# Patient Record
Sex: FEMALE | Race: BLACK OR AFRICAN AMERICAN | Employment: FULL TIME | URBAN - METROPOLITAN AREA
[De-identification: names, ages, dates, MRNs, and addresses within clinical notes are randomized per-mention and may not be internally consistent; named-entity substitution may affect disease eponyms.]

---

## 2020-07-17 ENCOUNTER — APPOINTMENT (OUTPATIENT)
Dept: GENERAL RADIOLOGY | Age: 55
End: 2020-07-17

## 2020-07-17 ENCOUNTER — APPOINTMENT (OUTPATIENT)
Dept: CT IMAGING | Age: 55
End: 2020-07-17

## 2020-07-17 ENCOUNTER — HOSPITAL ENCOUNTER (OUTPATIENT)
Age: 55
Setting detail: OBSERVATION
Discharge: HOME | End: 2020-07-19
Attending: EMERGENCY MEDICINE | Admitting: INTERNAL MEDICINE
Payer: MEDICAID

## 2020-07-17 LAB
ALBUMIN SERPL-MCNC: 3.9 G/DL (ref 3.4–5)
ALP BLD-CCNC: 106 U/L (ref 40–129)
ALT SERPL-CCNC: 15 U/L (ref 10–40)
ANION GAP SERPL CALCULATED.3IONS-SCNC: 13 MMOL/L (ref 3–16)
AST SERPL-CCNC: 12 U/L (ref 15–37)
BASOPHILS ABSOLUTE: 0 K/UL (ref 0–0.2)
BASOPHILS RELATIVE PERCENT: 0.4 %
BILIRUB SERPL-MCNC: 0.6 MG/DL (ref 0–1)
BILIRUBIN DIRECT: <0.2 MG/DL (ref 0–0.3)
BILIRUBIN URINE: NEGATIVE
BILIRUBIN, INDIRECT: ABNORMAL MG/DL (ref 0–1)
BLOOD, URINE: NEGATIVE
BUN BLDV-MCNC: 10 MG/DL (ref 7–20)
CALCIUM SERPL-MCNC: 9.3 MG/DL (ref 8.3–10.6)
CHLORIDE BLD-SCNC: 104 MMOL/L (ref 99–110)
CLARITY: ABNORMAL
CO2: 22 MMOL/L (ref 21–32)
COLOR: YELLOW
CREAT SERPL-MCNC: 0.6 MG/DL (ref 0.6–1.1)
EOSINOPHILS ABSOLUTE: 0.1 K/UL (ref 0–0.6)
EOSINOPHILS RELATIVE PERCENT: 1.6 %
GFR AFRICAN AMERICAN: >60
GFR NON-AFRICAN AMERICAN: >60
GLUCOSE BLD-MCNC: 312 MG/DL (ref 70–99)
GLUCOSE BLD-MCNC: 333 MG/DL (ref 70–99)
GLUCOSE URINE: >=1000 MG/DL
HCG(URINE) PREGNANCY TEST: NEGATIVE
HCT VFR BLD CALC: 36.1 % (ref 36–48)
HEMOGLOBIN: 11.5 G/DL (ref 12–16)
INR BLD: 1.14 (ref 0.86–1.14)
KETONES, URINE: 40 MG/DL
LEUKOCYTE ESTERASE, URINE: ABNORMAL
LYMPHOCYTES ABSOLUTE: 2 K/UL (ref 1–5.1)
LYMPHOCYTES RELATIVE PERCENT: 37.6 %
MCH RBC QN AUTO: 22.4 PG (ref 26–34)
MCHC RBC AUTO-ENTMCNC: 31.9 G/DL (ref 31–36)
MCV RBC AUTO: 70.3 FL (ref 80–100)
MICROSCOPIC EXAMINATION: YES
MONOCYTES ABSOLUTE: 0.4 K/UL (ref 0–1.3)
MONOCYTES RELATIVE PERCENT: 7.1 %
NEUTROPHILS ABSOLUTE: 2.8 K/UL (ref 1.7–7.7)
NEUTROPHILS RELATIVE PERCENT: 53.3 %
NITRITE, URINE: NEGATIVE
PDW BLD-RTO: 13.8 % (ref 12.4–15.4)
PERFORMED ON: ABNORMAL
PH UA: 6.5 (ref 5–8)
PLATELET # BLD: 122 K/UL (ref 135–450)
PMV BLD AUTO: 10.9 FL (ref 5–10.5)
POTASSIUM REFLEX MAGNESIUM: 3.7 MMOL/L (ref 3.5–5.1)
PRO-BNP: 32 PG/ML (ref 0–124)
PROTEIN UA: ABNORMAL MG/DL
PROTHROMBIN TIME: 13.2 SEC (ref 10–13.2)
RBC # BLD: 5.14 M/UL (ref 4–5.2)
SODIUM BLD-SCNC: 139 MMOL/L (ref 136–145)
SPECIFIC GRAVITY UA: >1.03 (ref 1–1.03)
TOTAL CK: 81 U/L (ref 26–192)
TOTAL PROTEIN: 6.6 G/DL (ref 6.4–8.2)
URINE TYPE: ABNORMAL
UROBILINOGEN, URINE: 1 E.U./DL
WBC # BLD: 5.2 K/UL (ref 4–11)

## 2020-07-17 PROCEDURE — 80076 HEPATIC FUNCTION PANEL: CPT

## 2020-07-17 PROCEDURE — 85025 COMPLETE CBC W/AUTO DIFF WBC: CPT

## 2020-07-17 PROCEDURE — 83880 ASSAY OF NATRIURETIC PEPTIDE: CPT

## 2020-07-17 PROCEDURE — 86592 SYPHILIS TEST NON-TREP QUAL: CPT

## 2020-07-17 PROCEDURE — 93005 ELECTROCARDIOGRAM TRACING: CPT | Performed by: EMERGENCY MEDICINE

## 2020-07-17 PROCEDURE — 80307 DRUG TEST PRSMV CHEM ANLYZR: CPT

## 2020-07-17 PROCEDURE — 85610 PROTHROMBIN TIME: CPT

## 2020-07-17 PROCEDURE — 71045 X-RAY EXAM CHEST 1 VIEW: CPT

## 2020-07-17 PROCEDURE — 86762 RUBELLA ANTIBODY: CPT

## 2020-07-17 PROCEDURE — 99285 EMERGENCY DEPT VISIT HI MDM: CPT

## 2020-07-17 PROCEDURE — 82550 ASSAY OF CK (CPK): CPT

## 2020-07-17 PROCEDURE — 84703 CHORIONIC GONADOTROPIN ASSAY: CPT

## 2020-07-17 PROCEDURE — 87340 HEPATITIS B SURFACE AG IA: CPT

## 2020-07-17 PROCEDURE — 70450 CT HEAD/BRAIN W/O DYE: CPT

## 2020-07-17 PROCEDURE — 81001 URINALYSIS AUTO W/SCOPE: CPT

## 2020-07-17 PROCEDURE — 80048 BASIC METABOLIC PNL TOTAL CA: CPT

## 2020-07-17 SDOH — HEALTH STABILITY: MENTAL HEALTH: HOW OFTEN DO YOU HAVE A DRINK CONTAINING ALCOHOL?: NEVER

## 2020-07-18 ENCOUNTER — APPOINTMENT (OUTPATIENT)
Dept: CT IMAGING | Age: 55
End: 2020-07-18

## 2020-07-18 ENCOUNTER — APPOINTMENT (OUTPATIENT)
Dept: MRI IMAGING | Age: 55
End: 2020-07-18

## 2020-07-18 PROBLEM — R29.90 STROKE-LIKE SYMPTOMS: Status: ACTIVE | Noted: 2020-07-18

## 2020-07-18 LAB
ABO/RH: NORMAL
AMPHETAMINE SCREEN, URINE: NORMAL
ANION GAP SERPL CALCULATED.3IONS-SCNC: 15 MMOL/L (ref 3–16)
ANTIBODY SCREEN: NORMAL
BACTERIA: ABNORMAL /HPF
BARBITURATE SCREEN URINE: NORMAL
BASOPHILS ABSOLUTE: 0 K/UL (ref 0–0.2)
BASOPHILS RELATIVE PERCENT: 0.3 %
BENZODIAZEPINE SCREEN, URINE: NORMAL
BUN BLDV-MCNC: 11 MG/DL (ref 7–20)
CALCIUM SERPL-MCNC: 9.7 MG/DL (ref 8.3–10.6)
CANNABINOID SCREEN URINE: NORMAL
CHLORIDE BLD-SCNC: 98 MMOL/L (ref 99–110)
CO2: 20 MMOL/L (ref 21–32)
COCAINE METABOLITE SCREEN URINE: NORMAL
CREAT SERPL-MCNC: 0.7 MG/DL (ref 0.6–1.1)
EKG ATRIAL RATE: 77 BPM
EKG DIAGNOSIS: NORMAL
EKG P AXIS: 67 DEGREES
EKG P-R INTERVAL: 176 MS
EKG Q-T INTERVAL: 394 MS
EKG QRS DURATION: 82 MS
EKG QTC CALCULATION (BAZETT): 445 MS
EKG R AXIS: 3 DEGREES
EKG T AXIS: 47 DEGREES
EKG VENTRICULAR RATE: 77 BPM
EOSINOPHILS ABSOLUTE: 0.1 K/UL (ref 0–0.6)
EOSINOPHILS RELATIVE PERCENT: 2.7 %
EPITHELIAL CELLS, UA: 4 /HPF (ref 0–5)
GFR AFRICAN AMERICAN: >60
GFR NON-AFRICAN AMERICAN: >60
GLUCOSE BLD-MCNC: 244 MG/DL (ref 70–99)
HCT VFR BLD CALC: 38.9 % (ref 36–48)
HEMOGLOBIN: 12.3 G/DL (ref 12–16)
HEPATITIS B SURFACE ANTIGEN INTERPRETATION: ABNORMAL
HYALINE CASTS: 1 /LPF (ref 0–8)
LYMPHOCYTES ABSOLUTE: 2.3 K/UL (ref 1–5.1)
LYMPHOCYTES RELATIVE PERCENT: 42.1 %
Lab: NORMAL
MCH RBC QN AUTO: 22.3 PG (ref 26–34)
MCHC RBC AUTO-ENTMCNC: 31.7 G/DL (ref 31–36)
MCV RBC AUTO: 70.4 FL (ref 80–100)
METHADONE SCREEN, URINE: NORMAL
MONOCYTES ABSOLUTE: 0.5 K/UL (ref 0–1.3)
MONOCYTES RELATIVE PERCENT: 8.3 %
NEUTROPHILS ABSOLUTE: 2.6 K/UL (ref 1.7–7.7)
NEUTROPHILS RELATIVE PERCENT: 46.6 %
OPIATE SCREEN URINE: NORMAL
OXYCODONE URINE: NORMAL
PDW BLD-RTO: 13.8 % (ref 12.4–15.4)
PH UA: 6
PHENCYCLIDINE SCREEN URINE: NORMAL
PLATELET # BLD: 129 K/UL (ref 135–450)
PLATELET SLIDE REVIEW: ABNORMAL
PMV BLD AUTO: 10.9 FL (ref 5–10.5)
POTASSIUM REFLEX MAGNESIUM: 3.7 MMOL/L (ref 3.5–5.1)
PROPOXYPHENE SCREEN: NORMAL
RBC # BLD: 5.53 M/UL (ref 4–5.2)
RBC UA: 1 /HPF (ref 0–4)
RUBELLA ANTIBODY IGG: 75.1 IU/ML
SODIUM BLD-SCNC: 133 MMOL/L (ref 136–145)
TOTAL SYPHILLIS IGG/IGM: ABNORMAL
WBC # BLD: 5.5 K/UL (ref 4–11)
WBC UA: 57 /HPF (ref 0–5)

## 2020-07-18 PROCEDURE — 70496 CT ANGIOGRAPHY HEAD: CPT

## 2020-07-18 PROCEDURE — 94760 N-INVAS EAR/PLS OXIMETRY 1: CPT

## 2020-07-18 PROCEDURE — G0378 HOSPITAL OBSERVATION PER HR: HCPCS

## 2020-07-18 PROCEDURE — 97530 THERAPEUTIC ACTIVITIES: CPT

## 2020-07-18 PROCEDURE — 86901 BLOOD TYPING SEROLOGIC RH(D): CPT

## 2020-07-18 PROCEDURE — 96365 THER/PROPH/DIAG IV INF INIT: CPT

## 2020-07-18 PROCEDURE — 97162 PT EVAL MOD COMPLEX 30 MIN: CPT

## 2020-07-18 PROCEDURE — 6370000000 HC RX 637 (ALT 250 FOR IP): Performed by: NURSE PRACTITIONER

## 2020-07-18 PROCEDURE — 6360000002 HC RX W HCPCS: Performed by: NURSE PRACTITIONER

## 2020-07-18 PROCEDURE — 36415 COLL VENOUS BLD VENIPUNCTURE: CPT

## 2020-07-18 PROCEDURE — 86900 BLOOD TYPING SEROLOGIC ABO: CPT

## 2020-07-18 PROCEDURE — 93010 ELECTROCARDIOGRAM REPORT: CPT | Performed by: INTERNAL MEDICINE

## 2020-07-18 PROCEDURE — 97116 GAIT TRAINING THERAPY: CPT

## 2020-07-18 PROCEDURE — 86850 RBC ANTIBODY SCREEN: CPT

## 2020-07-18 PROCEDURE — 6360000004 HC RX CONTRAST MEDICATION: Performed by: INTERNAL MEDICINE

## 2020-07-18 PROCEDURE — 2580000003 HC RX 258: Performed by: NURSE PRACTITIONER

## 2020-07-18 PROCEDURE — 70450 CT HEAD/BRAIN W/O DYE: CPT

## 2020-07-18 PROCEDURE — 70551 MRI BRAIN STEM W/O DYE: CPT

## 2020-07-18 PROCEDURE — 80048 BASIC METABOLIC PNL TOTAL CA: CPT

## 2020-07-18 PROCEDURE — 97166 OT EVAL MOD COMPLEX 45 MIN: CPT

## 2020-07-18 PROCEDURE — 86780 TREPONEMA PALLIDUM: CPT

## 2020-07-18 RX ORDER — POLYETHYLENE GLYCOL 3350 17 G/17G
17 POWDER, FOR SOLUTION ORAL DAILY PRN
Status: DISCONTINUED | OUTPATIENT
Start: 2020-07-18 | End: 2020-07-19 | Stop reason: HOSPADM

## 2020-07-18 RX ORDER — ASPIRIN 300 MG/1
300 SUPPOSITORY RECTAL DAILY
Status: DISCONTINUED | OUTPATIENT
Start: 2020-07-18 | End: 2020-07-19 | Stop reason: HOSPADM

## 2020-07-18 RX ORDER — LABETALOL HYDROCHLORIDE 5 MG/ML
10 INJECTION, SOLUTION INTRAVENOUS EVERY 10 MIN PRN
Status: DISCONTINUED | OUTPATIENT
Start: 2020-07-18 | End: 2020-07-19 | Stop reason: HOSPADM

## 2020-07-18 RX ORDER — SODIUM CHLORIDE 0.9 % (FLUSH) 0.9 %
10 SYRINGE (ML) INJECTION PRN
Status: DISCONTINUED | OUTPATIENT
Start: 2020-07-18 | End: 2020-07-19 | Stop reason: HOSPADM

## 2020-07-18 RX ORDER — SODIUM CHLORIDE 0.9 % (FLUSH) 0.9 %
10 SYRINGE (ML) INJECTION EVERY 12 HOURS SCHEDULED
Status: DISCONTINUED | OUTPATIENT
Start: 2020-07-18 | End: 2020-07-19 | Stop reason: HOSPADM

## 2020-07-18 RX ORDER — ONDANSETRON 2 MG/ML
4 INJECTION INTRAMUSCULAR; INTRAVENOUS EVERY 6 HOURS PRN
Status: DISCONTINUED | OUTPATIENT
Start: 2020-07-18 | End: 2020-07-19 | Stop reason: HOSPADM

## 2020-07-18 RX ORDER — ASPIRIN 81 MG/1
81 TABLET ORAL DAILY
Status: DISCONTINUED | OUTPATIENT
Start: 2020-07-18 | End: 2020-07-19 | Stop reason: HOSPADM

## 2020-07-18 RX ORDER — PROMETHAZINE HYDROCHLORIDE 25 MG/1
12.5 TABLET ORAL EVERY 6 HOURS PRN
Status: DISCONTINUED | OUTPATIENT
Start: 2020-07-18 | End: 2020-07-19 | Stop reason: HOSPADM

## 2020-07-18 RX ORDER — SODIUM CHLORIDE 9 MG/ML
INJECTION, SOLUTION INTRAVENOUS CONTINUOUS
Status: DISCONTINUED | OUTPATIENT
Start: 2020-07-18 | End: 2020-07-19 | Stop reason: HOSPADM

## 2020-07-18 RX ORDER — LACTOBACILLUS RHAMNOSUS GG 10B CELL
1 CAPSULE ORAL 2 TIMES DAILY WITH MEALS
Status: DISCONTINUED | OUTPATIENT
Start: 2020-07-18 | End: 2020-07-19 | Stop reason: HOSPADM

## 2020-07-18 RX ADMIN — SODIUM CHLORIDE: 9 INJECTION, SOLUTION INTRAVENOUS at 04:27

## 2020-07-18 RX ADMIN — ASPIRIN 81 MG: 81 TABLET, COATED ORAL at 08:47

## 2020-07-18 RX ADMIN — CEFTRIAXONE 1 G: 1 INJECTION, POWDER, FOR SOLUTION INTRAMUSCULAR; INTRAVENOUS at 04:27

## 2020-07-18 RX ADMIN — SODIUM CHLORIDE: 9 INJECTION, SOLUTION INTRAVENOUS at 16:55

## 2020-07-18 RX ADMIN — Medication 1 CAPSULE: at 08:47

## 2020-07-18 RX ADMIN — IOPAMIDOL 75 ML: 755 INJECTION, SOLUTION INTRAVENOUS at 10:49

## 2020-07-18 RX ADMIN — Medication 1 CAPSULE: at 16:55

## 2020-07-18 ASSESSMENT — ENCOUNTER SYMPTOMS
SHORTNESS OF BREATH: 0
VOMITING: 0
NAUSEA: 0
SORE THROAT: 0
ABDOMINAL PAIN: 0
EYES NEGATIVE: 1
COUGH: 0

## 2020-07-18 ASSESSMENT — PAIN SCALES - GENERAL
PAINLEVEL_OUTOF10: 3
PAINLEVEL_OUTOF10: 0

## 2020-07-18 ASSESSMENT — PAIN DESCRIPTION - DESCRIPTORS: DESCRIPTORS: DISCOMFORT

## 2020-07-18 ASSESSMENT — PAIN DESCRIPTION - ONSET: ONSET: ON-GOING

## 2020-07-18 ASSESSMENT — PAIN - FUNCTIONAL ASSESSMENT: PAIN_FUNCTIONAL_ASSESSMENT: PREVENTS OR INTERFERES SOME ACTIVE ACTIVITIES AND ADLS

## 2020-07-18 ASSESSMENT — PAIN DESCRIPTION - FREQUENCY: FREQUENCY: CONTINUOUS

## 2020-07-18 ASSESSMENT — PAIN DESCRIPTION - PAIN TYPE: TYPE: ACUTE PAIN

## 2020-07-18 ASSESSMENT — PAIN DESCRIPTION - PROGRESSION: CLINICAL_PROGRESSION: NOT CHANGED

## 2020-07-18 ASSESSMENT — PAIN DESCRIPTION - ORIENTATION: ORIENTATION: RIGHT

## 2020-07-18 ASSESSMENT — PAIN DESCRIPTION - LOCATION: LOCATION: ARM

## 2020-07-18 NOTE — PROGRESS NOTES
Occupational Therapy   Occupational Therapy Initial Assessment  Date: 2020   Patient Name: Aiden Brown  MRN: [id number]     : 1965    Date of Service: 2020    Assessment: Pt is 53 y.o. F who presents with stroke-like symptoms. Pt reports she fell on the floor and has been crawling around for a few days. Pt recently hurt RUE at work and uses minimally upon request however reports using it to complete ADL needs. PTA pt lives in apt with son with 8 ABDIR[address]AK. Pt reports independence in self-care tasks, homemaking responsibilities, and functional mobility with no device. Currently, pt presents with generally good strength and movement in UEs however discoordinated in RUE. Unsure if testing accurate d/t pt limited understanding of therapists request. Pt completed bed mobility with SBA/SPV and sit <> stand transfers with CGA. Pt completed functional mobility with RW with CGA/min A. Pt somewhat unsteady d/t minimal WBing on R toes. Pt declines need for ADL tasks however anticipate may require mod A for ADL needs. At this time, pt is unsafe to d/c home and would benefit from continued therapy to increase mobility, safety, and independence. Will continued to assess for appropriate discharge disposition pending progress with therapy and further medical workup. Discharge Recommendations:  Continue to assess pending progress, Patient would benefit from continued therapy after discharge       Assessment   Performance deficits / Impairments: Decreased functional mobility ; Decreased safe awareness;Decreased ADL status; Decreased endurance;Decreased strength;Decreased balance  Assessment: Pt is 53 y.o. F who presents with stroke-like symptoms. Pt reports she fell on the floor and has been crawling around for a few days. Pt recently hurt RUE at work and uses minimally upon request however reports using it to complete ADL needs. PTA pt lives in apt with son with 8 ABDIRIZAK.  Pt reports independence in self-care tasks, homemaking responsibilities, and functional mobility with no device. Currently, pt presents with generally good strength and movement in UEs however discoordinated in RUE. Unsure if testing accurate d/t pt limited understanding of therapists request. Pt completed bed mobility with SBA/SPV and sit <> stand transfers with CGA. Pt completed functional mobility with RW with CGA/min A. Pt somewhat unsteady d/t minimal WBing on R toes. Pt declines need for ADL tasks however anticipate may require mod A for ADL needs. At this time, pt is unsafe to d/c home and would benefit from continued therapy to increase mobility, safety, and independence. Will continued to assess for appropriate discharge disposition pending progress with therapy and further medical workup. Prognosis: Fair  Decision Making: Medium Complexity  Exam: ADLs, transfers, func mob, bed mob  Assistance / Modification: CGA/min A for mobility and ADLs  OT Education: OT Role;Transfer Training;Plan of Care;ADL Adaptive Strategies  REQUIRES OT FOLLOW UP: Yes  Activity Tolerance  Activity Tolerance: Patient Tolerated treatment well  Safety Devices  Safety Devices in place: Yes(RN (Mily) notified)  Type of devices: Nurse notified;Gait belt;Call light within reach; Chair alarm in place; Left in chair           Patient Diagnosis(es): The primary encounter diagnosis was Difficulty walking. Diagnoses of Right arm numbness, Right leg numbness, Right hand pain, Right sided weakness, and Confusion were also pertinent to this visit. has no past medical history on file. has no past surgical history on file. Restrictions  Restrictions/Precautions  Restrictions/Precautions: Fall Risk    Subjective   General  Chart Reviewed: Yes  Patient assessed for rehabilitation services?: Yes  Additional Pertinent Hx: Per Kayleigh Landin MD: Pt is \"53 y.o. female who presents to the emergency department via EMS secondary to concern for fall and inability to walk.  She reports she is here today because she fell 2 days ago. She states she has been crawling around at home. She lives with her son but states he would not call because he was too scared (endorses history of mental health issues for him). She states even though she could crawl around she cannot get to her phone until today when she was able to and then texted her daughter who called EMS. She tells me she has a lot of pain in her right arm that is due to a work injury several weeks ago for which she has seen a physician twice most recently this past Wednesday. She perseverates on how the exam that day made her arm pain worse. She states that evening the arm pain proceeded to numbness/weakness at all so went into her right lower extremity and this is why she fell because she was unable to bear weight on that leg. She denies any trauma or falls. Denies any known sick contacts. She states she has been eating and drinking less but has been able to eat and drink some while at home crawling around on the floor. \"  Family / Caregiver Present: No  Referring Practitioner: RADHIKA Dumont NP  Diagnosis: Stroke-like symptoms  Subjective  Subjective: Pt met bedside, agreeable for therapy evaluation and mobility tasks.   Patient Currently in Pain: (Painful RUE and R foot - unable to describe)  Vital Signs  Temp: 98 °F (36.7 °C)  Temp Source: Oral  Pulse: 65  Heart Rate Source: Telemetry  Resp: 16  BP: 113/74  BP Location: Left upper arm  MAP (mmHg): 87  Patient Currently in Pain: (Painful RUE and R foot - unable to describe)  Height and Weight  Height: 5' 2\" (157.5 cm)  Oxygen Therapy  SpO2: 98 %  Pulse Oximeter Device Mode: Intermittent  Pulse Oximeter Device Location: Finger  O2 Device: None (Room air)     Social/Functional History  Social/Functional History  Lives With: Son(son is temp living with her pt pt)  Type of Home: Apartment  Home Layout: One level  Home Access: Stairs to enter with rails  Entrance Stairs - Number of Steps: 8 rd, 1 rail. Bathroom Shower/Tub: Tub/Shower unit  Bathroom Toilet: Standard  ADL Assistance: Independent(reported \"doing both\" shower & sponge bathing recently)  Homemaking Assistance: Independent  Ambulation Assistance: Independent  Transfer Assistance: Independent  Active : No(friend drives pt to work)  Type of occupation: [employer]--not working x 3 weeks per pt. Additional Comments: pt reported recently injuring R foot from fall at home, but also a R shoulder injury recently from work       Objective   Vision: Within Functional Limits  Hearing: Within functional limits      Orientation  Orientation Level: Oriented to situation;Oriented to person     Balance  Sitting Balance: Stand by assistance  Standing Balance: Contact guard assistance  Standing Balance  Time: ~1 minute  Activity: Func mob, transfers    Functional Mobility  Functional - Mobility Device: Rolling Walker  Activity: Other  Assist Level: (CGA/min A)  Functional Mobility Comments: Pt completed functional mobility with RW with CGA/min A - required assist to navigate RW. Pt holding R toes up in the air while walking d/t pain. Pt required assist to place RUE on handle of RW then able to grasp without difficulty. ADL  Additional Comments: PTA pt reports independence in self-care tasks. Anticipate pt to require min/mod A for bathing/dressing/toileting needs at this time. Pt reports using RUE to assist in ADL tasks prior to this episode despite injury. Pt declined need to complete self-care tasks at this time. Tone RUE  RUE Tone: Normotonic  Tone LUE  LUE Tone: Normotonic  Coordination  Movements Are Fluid And Coordinated: No  Coordination and Movement description: Right UE  Quality of Movement Other  Comment: Pt reports injury to RUE ~3 weeks ago, reports she has been using it for self-care tasks until recently. Pt with minimal use of RUE during this session - able to grasp.      Bed mobility  Supine to Sit: Stand by assistance;Supervision(slight struggle, able to complete with HOB slightly elevated)  Sit to Supine: Unable to assess(Up in chair at end of session)     Transfers  Sit to stand: Contact guard assistance;Minimal assistance  Stand to sit: Contact guard assistance  Transfer Comments: CGA/min A for sit <> stand from EOB to RW and sit to recliner chair, cues for safe hand placement and orientation to transfer surface - sat prior to back up to chair     Cognition  Overall Cognitive Status: Exceptions  Following Commands: Follows one step commands with increased time  Memory: Decreased recall of recent events  Safety Judgement: Decreased awareness of need for assistance;Decreased awareness of need for safety  Problem Solving: Assistance required to generate solutions;Assistance required to implement solutions  Insights: Decreased awareness of deficits  Initiation: Requires cues for some  Sequencing: Requires cues for some  Cognition Comment: Limited insight, difficulty with timeline of events from past few days or months. Unable to describe sensations and recent levels of function at home.         Sensation  Overall Sensation Status: (Reports occasional N/T in R hand, sensitive R toes)        LUE AROM (degrees)  LUE AROM : WFL  Left Hand AROM (degrees)  Left Hand AROM: WFL  RUE AROM (degrees)  RUE AROM : WFL  RUE General AROM: increased time  Right Hand AROM (degrees)  Right Hand AROM: WFL  Right Hand General AROM: increased time     LUE Strength  Gross LUE Strength: WFL  RUE Strength  Gross RUE Strength: WFL  RUE Strength Comment: Did not follow all cues for strength testing, functional for self-care tasks and reports basic use          Plan   Plan  Times per week: 3-5  Times per day: Daily  Current Treatment Recommendations: Strengthening, Endurance Training, Balance Training, Functional Mobility Training, Safety Education & Training, Equipment Evaluation, Education, & procurement, Patient/Caregiver Education & Training, Self-Care / ADL    AM-PAC Score   Continue to assess pending progress with therapy - unsafe with current mobility and would require hands on assist.      AM-PAC Inpatient Daily Activity Raw Score: 16 (07/18/20 1146)  AM-PAC Inpatient ADL T-Scale Score : 35.96 (07/18/20 1146)  ADL Inpatient CMS 0-100% Score: 53.32 (07/18/20 1146)  ADL Inpatient CMS G-Code Modifier : CK (07/18/20 1146)    Goals  Short term goals  Time Frame for Short term goals: prior to d/c  Short term goal 1: Pt will complete functional mobility with SPV  Short term goal 2: Pt will complete transfers with SpV  Short term goal 3: Pt will complete toileting with SPV  Short term goal 4: Pt will tolerate 4+ minutes of standing activity to increase strength and activity tolerance for self-care and transfers  Patient Goals   Patient goals : Pt hopeful to return home with son       Therapy Time   Individual Concurrent Group Co-treatment   Time In       1110   Time Out       1140   Minutes       30   Timed Code Treatment Minutes: 15 Minutes(15 min eval)     If pt is discharged prior to next OT session, this note will serve as the discharge summary.     VICTORINO Gimenez/B#[id number]

## 2020-07-18 NOTE — ED NOTES
Pt resting in bed with call light present, provided warm blanket     Jayme Rodriguez RN  07/18/20 1315

## 2020-07-18 NOTE — ED PROVIDER NOTES
[facility]      Pt Name: Rose Apodaca  MRN: [id number]  [place] 1965  Date of evaluation: 7/17/2020  Provider: Bonita Francois MD    CHIEF COMPLAINT       Chief Complaint   Patient presents with    Extremity Weakness     right arm and leg x days, pt found down on floor, fell on Thur     HISTORY OF PRESENT ILLNESS   (Location/Symptom, Timing/Onset,Context/Setting, Quality, Duration, Modifying Factors, Severity)  Note limiting factors. Rose Apodaca is a 53 y.o. female who presents to the emergency department via EMS secondary to concern for fall and inability to walk. She reports she is here today because she fell 2 days ago. She states she has been crawling around at home. She lives with her son but states he would not call because he was too scared (endorses history of mental health issues for him). She states even though she could crawl around she cannot get to her phone until today when she was able to and then texted her daughter who called EMS. She tells me she has a lot of pain in her right arm that is due to a work injury several weeks ago for which she has seen a physician twice most recently this past Wednesday. She perseverates on how the exam that day made her arm pain worse. She states that evening the arm pain proceeded to numbness/weakness at all so went into her right lower extremity and this is why she fell because she was unable to bear weight on that leg. She denies any trauma or falls. Denies any known sick contacts. She states she has been eating and drinking less but has been able to eat and drink some while at home crawling around on the floor. She has no medical history noted below and denies any significant medical history, denies taking any medications on a regular basis, denies any known mental health issues for her.  Aside from what is stated above denies any other symptoms or modifying General: No tenderness, deformity or signs of injury. Right forearm: She exhibits no bony tenderness, no swelling, no edema, no deformity and no laceration. Arms:       Right lower leg: No edema. Left lower leg: No edema. Skin:     General: Skin is warm and dry. Capillary Refill: Capillary refill takes less than 2 seconds. Findings: No erythema or rash. Neurological:      Mental Status: She is alert and oriented to person, place, and time. Comments: Face grossly symmetric. Oriented to name, location, date of birth, age, president, year. When asked to lift right leg up off of bed she states she is unable to lift up her right lower extremity though when asked to bend her knee she then lifts her leg up off the bed and is unable to bend her knee for knee flexion/extension. She can do Dorsiflexion and plantar flexion bilaterally. When we got her out of bed to walk she requires help moving right leg over though when getting back in bed she was able to bring it up to get in. Gait is very unsteady, appears afraid to place weight on RLE and states she is not in pain in her RLE just numb. Numbness is subjective as objectively she can feel in all extremities, endorses it feels \"different\" on right side - does not follow dermatomal pattern   Psychiatric:         Attention and Perception: Attention normal.         Mood and Affect: Affect is tearful (when examining RUE becomes very tearful and holds her arm/hand in flexed contorted position. After leg exam her arm has relaxed). Speech: Speech is tangential (normal speech though intermittently tangential ). DIAGNOSTIC RESULTS   EKG: All EKG's are interpreted by the Emergency Department Physician who either signs or Co-signsthis chart in the absence of a cardiologist.    Indication: Weakness  Interpretation: Rate 77, rhythm sinus, axis normal.  SD/QRS/QTc within normal limits. No T/ST changes consistent with acute ischemia.   No prior EKG available for comparison. RADIOLOGY:   Interpretation per the Radiologist below, if available at the time of this note:  XR CHEST PORTABLE   Final Result   No acute findings. CT Head WO Contrast   Final Result   No acute intracranial abnormality.            LABS:  Labs Reviewed   CBC WITH AUTO DIFFERENTIAL - Abnormal; Notable for the following components:       Result Value    Hemoglobin 11.5 (*)     MCV 70.3 (*)     MCH 22.4 (*)     Platelets 615 (*)     MPV 10.9 (*)     All other components within normal limits    Narrative:     Performed at:  [address] [facility] [address]   Phone [phone]   BASIC METABOLIC PANEL W/ REFLEX TO MG FOR LOW K - Abnormal; Notable for the following components:    Glucose 312 (*)     All other components within normal limits    Narrative:     Performed at:  [address] [facility] [address]   Phone [phone]   HEPATIC FUNCTION PANEL - Abnormal; Notable for the following components:    AST 12 (*)     All other components within normal limits    Narrative:     Performed at:  [address] [facility] [address]   Phone [phone]   URINALYSIS - Abnormal; Notable for the following components:    Clarity, UA CLOUDY (*)     Glucose, Ur >=1000 (*)     Ketones, Urine 40 (*)     Protein, UA TRACE (*)     Leukocyte Esterase, Urine SMALL (*)     All other components within normal limits    Narrative:     Performed at:  [address] [facility] [address]   Phone [phone]   MICROSCOPIC URINALYSIS - Abnormal; Notable for the following components:    Bacteria, UA RARE (*)     WBC, UA 57 (*)     All other components within normal limits    Narrative:     Performed at:  [facility]  [address] [place] [id number]   Phone [phone]   POCT GLUCOSE - Abnormal; Notable for the following components:    POC Glucose 333 (*)     All other components within normal limits    Narrative:     Performed at:  [facility]  [address] [place] [address]   Phone [phone]   BRAIN NATRIURETIC PEPTIDE    Narrative:     Performed at:  [facility]  [address] [place] [address]   Phone [phone]   PROTIME-INR    Narrative:     Performed at:  [facility]  [address] [place] [address]   Phone [phone]   CK    Narrative:     Performed at:  [facility]  [address] [place] [address]   Phone [phone]   OBSTETRIC PANEL    Narrative:     Performed at:  [facility]  [address] [place] [address]   Phone [phone]   URINE DRUG SCREEN    Narrative:     Performed at:  [facility]  [address] [place] [address]   Phone [phone]   PREGNANCY, URINE    Narrative:     Performed at:  [facility]  [address] [place] [address]   Phone [phone]   TYPE AND SCREEN       All other labs were within normal range or not returned as of this dictation. EMERGENCY DEPARTMENT COURSE and DIFFERENTIAL DIAGNOSIS/MDM:   Vitals:    Vitals:    07/17/20 2042 07/17/20 2100   BP: 132/69 127/80   Pulse: 88    Resp: 14    Temp: 98.9 °F (37.2 °C)    TempSrc: Oral    SpO2: 95% 97%   Weight: 185 lb 3 oz (84 kg)    Height: 5' 2\" (1.575 m)      50-year-old female who presents to the emergency department via EMS status post fall. On arrival she is awake, alert, oriented with vitals that are hemodynamically stable and within normal limits.   Her exam is difficult as she appears to have confusion understanding basic commands when

## 2020-07-18 NOTE — PROGRESS NOTES
Upon admission, pt stated she has been unable to use R arm d/t work injury. Pt was able to follow commands to move arm during assessment but has refused to use arm all day stating \"that's my hurt arm\". Pt has denied any pain in arm this shift. Writer entered room while pt was sitting on side of bed eating. Writer observed pt feeding self with R arm with no signs of pain or limited ability to move. When pt noted writer to be in room she dropped her R arm back to her side and began to eat with her left.

## 2020-07-18 NOTE — PROGRESS NOTES
4 Eyes Skin Assessment     The patient is being assess for  Admission    I agree that 2 RN's have performed a thorough Head to Toe Skin Assessment on the patient. ALL assessment sites listed below have been assessed. Areas assessed by both nurses:  [x]   Head, Face, and Ears   [x]   Shoulders, Back, and Chest  [x]   Arms, Elbows, and Hands   [x]   Coccyx, Sacrum, and IschIum  [x]   Legs, Feet, and Heels        Does the Patient have Skin Breakdown?   No         [place] Prevention initiated:  Yes   Wound Care Orders initiated:  No      [facility] nurse consulted for Pressure Injury (Stage 3,4, Unstageable, DTI, NWPT, and Complex wounds), New and Established Ostomies:  No      Nurse 1 eSignature: Electronically signed by Gio Winston RN on 7/18/20 at 6:38 AM EDT    **SHARE this note so that the co-signing nurse is able to place an eSignature**    Nurse 2 eSignature: Electronically signed by Chanel Toure RN on 7/18/20 at 6:44 AM EDT

## 2020-07-18 NOTE — H&P
Hospital Medicine History & Physical      PCP: No primary care provider on file. Date of Admission: 7/17/2020    Date of Service: Pt seen/examined on 7/18/2020 and Placed in Observation. Chief Complaint:  Right arm and leg weakness, fall. History Of Present Illness: The patient is a 53 y.o. female with no significant PMH and not on any medications normally - prescribed or OTC, who presents to [facility] with right arm and leg weakness complaining of fall at home and being unable to ambulate. Pt insists that she hurt her R arm while at work several days ago. She reports she works for [employer] and was pulling heavy items and hurt her arm. She is unable to provide any details of the incident. She then reports that she went to see a doctor and while her arm was examined it started hurting more. Again she is unable to tell me where exactly her arm is hurting. She then reports that she got up late Wednesday night from her bed and fell to the ground cause her R leg would not hold her. So she stayed on the ground since then and was crawling around the house. Her son who is suffering from mental health problems, is staying at her house now - she asked him to call EMS but he was scared and did not. She reports she eventually got to her phone and called her daughter and then her daughter called EMS. She can not tell my why she did not call EMS directly. She also can not explain to me how she was able to crawl back and forth to her kitchen and have water and eat cherries, but could not get to her phone. As per her report she was on the ground since late Wednesday night and until Friday when EMS brought her to ED. Past Medical History:    No past medical history on file.     Past Surgical History:    No past surgical history on file.    Medications Prior to Admission:    Prior to Admission medications    Not on File       Allergies:  Patient has no known allergies. Social History:  The patient currently lives at home. TOBACCO:   reports that she has been smoking. She has never used smokeless tobacco.  ETOH:   reports no history of alcohol use. Family History:  Reviewed in detail and negative for DM, Early CAD, Cancer, CVA. Positive as follows:    No family history on file. REVIEW OF SYSTEMS:   As noted in the HPI. All other systems reviewed and negative. PHYSICAL EXAM:    /74   Pulse 65   Temp 98 °F (36.7 °C) (Oral)   Resp 16   Ht 5' 2\" (1.575 m)   Wt 174 lb 13.2 oz (79.3 kg)   SpO2 98%   BMI 31.98 kg/m²     General appearance: No apparent distress appears stated age and cooperative. HEENT Normal cephalic, atraumatic without obvious deformity. Pupils equal, round, and reactive to light. Extra ocular muscles intact. Conjunctivae/corneas clear. Neck: Supple, No jugular venous distention/bruits. Trachea midline without thyromegaly or adenopathy with full range of motion. Lungs: Clear to auscultation, bilaterally without Rales/Wheezes/Rhonchi with good respiratory effort. Heart: Regular rate and rhythm with Normal S1/S2 without murmurs, rubs or gallops, point of maximum impulse non-displaced  Abdomen: Soft, non-tender or non-distended without rigidity or guarding and positive bowel sounds all four quadrants. Extremities: No clubbing, cyanosis, or edema bilaterally. Full range of motion without deformity and normal gait intact. Skin: Skin color, texture, turgor normal.  No rashes or lesions.   Neurologic: Alert and oriented X 3, she has deficits in her Right arm and leg that are clearly related to effort, as with enough reassurance she was able to perform most of the strength tasks with no weakness evident on exam. She is showing signs of coordination problem, which I am concerned is also related to effort, rather then true neurological deficit as these appear inconsistent and different from one attempt to the other. Capillary Refill: Acceptable  < 3 seconds  Peripheral Pulses: +3 Easily felt, not easily obliterated with pressure      CBC   Recent Labs     07/17/20 2131 07/18/20 0427   WBC 5.2 5.5   HGB 11.5* 12.3   HCT 36.1 38.9   * 129*      RENAL  Recent Labs     07/17/20 2131 07/18/20 0427    133*   K 3.7 3.7    98*   CO2 22 20*   BUN 10 11   CREATININE 0.6 0.7     LFT'S  Recent Labs     07/17/20 2131   AST 12*   ALT 15   BILIDIR <0.2   BILITOT 0.6   ALKPHOS 106     COAG  Recent Labs     07/17/20 2131   INR 1.14     CARDIAC ENZYMES  Recent Labs     07/17/20 2131   CKTOTAL 81       U/A:    Lab Results   Component Value Date    COLORU YELLOW 07/17/2020    WBCUA 57 07/17/2020    RBCUA 1 07/17/2020    BACTERIA RARE 07/17/2020    CLARITYU CLOUDY 07/17/2020    SPECGRAV >1.030 07/17/2020    LEUKOCYTESUR SMALL 07/17/2020    BLOODU Negative 07/17/2020    GLUCOSEU >=1000 07/17/2020       ABG  No results found for: VXC0IEX, BEART, Y7EIPRTB, PHART, THGBART, SOO5BUJ, PO2ART, EOC4OSH        Active Hospital Problems    Diagnosis Date Noted    Stroke-like symptoms [R29.90] 07/18/2020         PHYSICIANS CERTIFICATION:    I certify that Mis Reilly is expected to be hospitalized for less than 2 midnights based on the following assessment and plan:      ASSESSMENT/PLAN:    R Arm Weakness. R leg Weakness. Gait Ataxia. I do not appreciate any truely neurological deficit on exam - I am concerned about the fact that deficits may be effort related. She indeed was unable/unwilling to walk when I tried to ambulate her. I am not sure about secondary gain, though she insists that she hurt her arm at work. She is not very clear as to how her leg deficits play into that. CT imaging thus far has been reassuring. Brain MRI is pending. Lab work is unremarkable. Neurology involved.    Get PTOT eval.    If brain imaging is reassuring, she is stable for discharge. - pending clinical progress, psychiatry evaluation may be reasonable, especially if symptoms persist and continue to interfere with her ADLs. DVT Prophylaxis: lovenox  Diet: DIET GENERAL;  Dietary Nutrition Supplements: Diabetic Oral Supplement  Code Status: Full Code      Dispo - PCU obs. CVA r/o. Zack Sequeira MD    Thank you No primary care provider on file. for the opportunity to be involved in this patient's care. If you have any questions or concerns please feel free to contact me at [phone].

## 2020-07-18 NOTE — ED NOTES
Pt ambulated in room with ED MD and RN at side. Pt unsteady on feet.       Bernardo Martinez RN  07/18/20 5846

## 2020-07-18 NOTE — PROGRESS NOTES
Comprehensive Nutrition Assessment    Type and Reason for Visit:  Initial, Positive Nutrition Screen    Nutrition Recommendations/Plan:   General diet, monitor need for carb restriction  Glucerna BID  Will monitor nutritional adequacy, nutrition-related labs, weights, BMs, and clinical progress      Nutrition Assessment:  + Screen for malnutrition score of 4. Pt presented after fall, she had been \"crawling\" on the floor for last few days (reduced intake but will still eating some during this period). Admitted for possible stroke workup. Pt poor historian and limited wt records in EMR, could not confirm weight loss. General diet ordered, pt has not accepted much po this admission. Will order ONS. Malnutrition Assessment:  Malnutrition Status:  Insufficient data      Estimated Daily Nutrient Needs:  Energy (kcal):  8542-4796 kcal (20-22 kcal/kg ABW); Weight Used for Energy Requirements:  Current     Protein (g):  79-95 gm (1-1.2 gm/kg ABW); Weight Used for Protein Requirements:  Current        Fluid (ml/day):  1 ml/kcal; Weight Used for Fluid Requirements:  Current      Nutrition Related Findings:  No edema, Na low, glucose elevated      Wounds:  None       Current Nutrition Therapies:    DIET GENERAL; Anthropometric Measures:  · Height: 5' 2\" (157.5 cm)  · Current Body Weight: 174 lb (78.9 kg)   · Admission Body Weight: 185 lb (83.9 kg)    · Ideal Body Weight: 110 lbs; 158.2 lbs   · BMI: 31.8  · Adjusted Body Weight:  ; No Adjustment   · BMI Categories: Obese Class 1 (BMI 30.0-34. 9)       Nutrition Diagnosis:   · Inadequate oral intake related to lack or limited access to food as evidenced by poor intake prior to admission      Nutrition Interventions:   Food and/or Nutrient Delivery:  Continue Current Diet, Start Oral Nutrition Supplement  Nutrition Education/Counseling:  No recommendation at this time   Coordination of Nutrition Care:  No recommendation at this time, Continued Inpatient Monitoring    Goals: Tolerate diet and consume greater than 50% of meals and supplements this admission       Nutrition Monitoring and Evaluation:   Behavioral-Environmental Outcomes:  (NA)   Food/Nutrient Intake Outcomes:  Diet Advancement/Tolerance, Food and Nutrient Intake, Supplement Intake  Physical Signs/Symptoms Outcomes:  Biochemical Data, Nausea or Vomiting, Nutrition Focused Physical Findings, Skin, Weight     Discharge Planning:     Too soon to determine     Electronically signed by Ebony Kitchen RD, LD on 7/18/20 at 10:53 AM EDT    Contact: [phone]

## 2020-07-18 NOTE — PROGRESS NOTES
emphasized . The notebook also provides education on Stroke community resources and stroke advocacy. The need for follow-up after discharge was highlighted with patient/family with them being able to repeat understanding of the importance of this.       Electronically signed by Juliette Lewis RN on 7/18/2020 at 6:39 AM

## 2020-07-18 NOTE — PROGRESS NOTES
Speech Language Pathology    Evaluation attempted. Patient denies current speech, language, and/or swallowing difficulties but does endorse decreased speech earlier. Evaluation offered, but patient requests to hold assessment until Brain MRI results have been received. ST to re-attempt 7/20/2020. IF SLP eval is deemed no longer indicated, please discontinue SLP tamia/tx order. Thank you. Sunita Red, [address] [facility], [id number]  Speech-Language Pathologist

## 2020-07-18 NOTE — PROGRESS NOTES
Physical Therapy  Facility/Department: [address] PROGRESSIVE CARE  Initial Assessment    NAME: Chary Santos  : 1965  MRN: [id number]    Date of Service: 2020  Discharge Recommendations:  Continue to assess pending progress, Patient would benefit from continued therapy after discharge   PT Equipment Recommendations  Equipment Needed: Yes(will monitor needs--possible RW at present time.)  Mobility Devices: Bimal Hands: Rolling    Assessment   Body structures, Functions, Activity limitations: Decreased functional mobility ; Decreased balance  Assessment: Pt presents with Decreased functional mobility after admisison for \"Strok-like Symptoms\"  Prior to admission, pt reported living alone, with her son jacinto residing with her, in an apartment setting. Pt reported working prior to an arm injury 3 weeks ago, but now admitted with stroke like symptoms with falling at home and now c/o R foot pain. Ongoing testing being done. This date, pt was SBA for supinr to sitting, CG/Min A for functional transfers, and CG/Min A for ambulation x 20 ft with RW for support/balance and poor RW safety at present time. Ongoing skilled  therapy is warranted at present time, however, unsure if pt has insight into her deficits at present time. Will cont to follow up and make recs based on pt's pgoress with vincenzoiimichelle and her needs. Prognosis: Good  Decision Making: Medium Complexity  History: as noted  Exam: as above  Clinical Presentation: evolving  Barriers to Learning: ?insight  REQUIRES PT FOLLOW UP: Yes  Activity Tolerance  Activity Tolerance: Patient Tolerated treatment well       Patient Diagnosis(es): The primary encounter diagnosis was Difficulty walking. Diagnoses of Right arm numbness, Right leg numbness, Right hand pain, Right sided weakness, and Confusion were also pertinent to this visit. has no past medical history on file.    has no past surgical history on file.    Restrictions  Restrictions/Precautions  Restrictions/Precautions: Fall Risk  Vision/Hearing  Vision: Within Functional Limits  Hearing: Within functional limits     Subjective  General  Chart Reviewed: Yes  Patient assessed for rehabilitation services?: Yes  Additional Pertinent Hx: Per ED H & P, \"She reports she is here today because she fell 2 days ago. She states she has been crawling around at home. She lives with her son but states he would not call because he was too scared (endorses history of mental health issues for him). She states even though she could crawl around she cannot get to her phone until today when she was able to and then texted her daughter who called EMS. She tells me she has a lot of pain in her right arm that is due to a work injury several weeks ago for which she has seen a physician twice most recently this past Wednesday. She perseverates on how the exam that day made her arm pain worse. She states that evening the arm pain proceeded to numbness/weakness at all so went into her right lower extremity and this is why she fell because she was unable to bear weight on that leg. She denies any trauma or falls. Denies any known sick contacts. She states she has been eating and drinking less but has been able to eat and drink some while at home crawling around on the floor. \"  Admitted for Stroke like symptoms. Family / Caregiver Present: No  Subjective  Subjective: Pt awake and in supine, agrees to therapy session. Pt reported pain in R shoulder from work injury, as well as R foot pain/pain in toes yina with walking/bearing weight. Pt' angelica not rated.   Pain Screening  Patient Currently in Pain: (Painful RUE and R foot - unable to describe)     Orientation  Orientation  Overall Orientation Status: Within Functional Limits(?insight into deficits)  Orientation Level: Oriented to person;Oriented to situation  Social/Functional History  Social/Functional History  Lives With: therapy prior to going home. Unsure of pt's full understanding of this imformation. Stairs/Curb  Stairs?: No     Balance  Sitting - Static: Good;-  Sitting - Dynamic: Good;-  Standing - Static: Fair  Standing - Dynamic: Fair  Comments: SBA sitting eob, CG/Min A stance and amb with RW for support/balance. Plan   Plan  Times per week: 3-5 x wk in acute setting  Current Treatment Recommendations: Functional Mobility Training  Safety Devices  Type of devices: Left in chair, Nurse notified, Gait belt, Patient at risk for falls, Call light within reach, Chair alarm in place    AM-PAC Score  AM-PAC Inpatient Mobility Raw Score : 16 (07/18/20 1155)  AM-PAC Inpatient T-Scale Score : 40.78 (07/18/20 1155)  Mobility Inpatient CMS 0-100% Score: 54.16 (07/18/20 1155)  Mobility Inpatient CMS G-Code Modifier : CK (07/18/20 1155)     Goals  Short term goals  Time Frame for Short term goals: by acute d/c:  Short term goal 1: bed mobility S/I  Short term goal 2: transfers S/I  Short term goal 3: amb > 150 ft with LRAD and SBA/S, no lob. Short term goal 4: assess steps as needed for d/c plans. Patient Goals   Patient goals : pt's goal is eventula return home.      Therapy Time   Individual Concurrent Group Co-treatment   Time In 1110         Time Out Ekaterina [address] [employer] Electronically signed by Memo Lopez PT on 7/18/2020 at 11:57 AM

## 2020-07-18 NOTE — CONSULTS
weakness as well. She denies any precipitating factors. It is not entirely clear whether  the weakness was present before the fall or not. However, it appears  that she alludes to this though does not come right out and say it. She  denies any pain in the right lower extremity. She denies any previous  similar symptoms in the past.  She denies any medical history. Her  symptoms are ongoing though she has a difficult time explaining exactly  where her weakness is. She reports having significant difficulty with  supination of the right upper extremity. However, later during the  examination, she is noted to be able to supinate the right upper  extremity without significant difficulty. REVIEW OF SYSTEMS:  As above and per chart which has been reviewed by  me. All other remaining review of systems is negative on my  examination. PAST MEDICAL HISTORY:  She denies any prior medical conditions other  than the right upper extremity pain following a reported injury at work. She does report having been on a medication for diabetes in the past  though the details are unclear and she denies any actual diabetes  mellitus diagnosis. PAST SURGICAL HISTORY:  She does not endorse any prior surgeries. ALLERGIES:  She has no known drug allergies. MEDICATIONS:  As an outpatient, she was on no medications except for  baclofen which was recently added due to her right upper extremity  symptoms. SOCIAL HISTORY:  She denies any alcohol or illicit drug usage. She does  use tobacco on a daily basis, however. FAMILY HISTORY:  There are no reported neurologic diseases or disorders  in her family. PHYSICAL EXAMINATION:  VITAL SIGNS:  Temperature 98 degrees, respiratory rate 16, pulse 65,  blood pressure 113/74. GENERAL:  She is awake and alert and in no obvious distress. She  appears relatively stated age. HEENT:  Her head is normocephalic and atraumatic.   There are no Lee  signs or raccoon eyes associated with limited effort  at times. There appears to be some increased tone in the right upper  and lower extremities though this also fluctuates and may be due to  voluntary movement and contraction of muscles. There is no tremor noted  though she does have occasional rhythmic movements in the right lower  extremity which is distractible at times. Reflexes:  Deep tendon reflexes are 2+/4 bilaterally in biceps, triceps,  brachioradialis, and patellar regions. Plantar responses are flexor  bilaterally. Sensory Examination:  Sensation to pinprick is fully intact in all four  extremities in what appears to be all dermatomes and peripheral nerve  distribution. Vibratory sensation is intact throughout. Coordination:  Finger-to-nose is intact on the left. She reports that  she cannot do finger-to-nose testing on the right due to right arm pain. Gait and Station:  She is visualized ambulating with the Therapy team  with the use of a walker. She has prominent stiffness of the right  lower extremity which appears to wax and wane at times. She has  dorsiflexion noted with extension of the great toe and other toes as  well while she is walking. Occasionally, there is a circumducting type  of gait with the right leg, however, at other times appears to ambulate  much better. DIAGNOSTIC STUDIES:  Sodium 133, potassium 3.7, BUN 11, creatinine 0.7. CK 81. ALT 15, AST 12.  Glucose has been elevated with levels in the  200s and 300s. Urine drug screen was negative. White blood cell count  5.5. Urinalysis showed a small amount of leukocyte esterase with 57  white cells, rare bacteria were also noted. An EKG was also performed and showed normal sinus rhythm. Radiologic Studies:  CT scan of the head was performed and showed no  acute intracranial process. A CT angiogram of the head and neck was also performed and showed no  evidence of significant occlusion or aneurysm.   I have personally  reviewed

## 2020-07-18 NOTE — ED NOTES
Pt A&O to ED from home by EMS with c/o right sided weakness x days. Pt states that she was seen on Wednesday for right hand weakness and that she began having \"contorsions\" of her hand Wednesday night. Pt states that she also began having right leg weakness. Pt reports falling at home sometime Wednesday night into Thursday morning. The pt states that she was crawling around her home during this time. The pt daughter called EMS. Pt BG was 230 for EMS. Pt does states that she had and injury several weeks ago that caused right arm pain. Pt is able to move and feel both her right arm and right leg but the pt has jerky movement at times, both are weaker and she states decreased sensation in both.       Kamla Virk RN  07/18/20 [id number]

## 2020-07-18 NOTE — CONSULTS
Neurology Consult - 07/18/20    Impression:  1. 54 yo female with reported right upper and lower ext weakness (and paresthesias?) with gait abnormality and fall. States she was crawling on the floor for 2 days. Etiology unclear. Cannot entirely r/o cerebral ischemia though given nonphysiologic exam, a non-neurologic etiology appears more likely. 2. RUE pain/weakness following reported recent injury at work. 3. Metabolic encephalopathy - likely secondary to UTI? Plan:   1. Await MRI brain. 2. Further recommendations pending MRI if abnormal.  3. Your medical management. 4. Rehab efforts. 5. Above discussed with patient/nursing/PT/OT. 6. See dictated note. #[id number]      --  Thank you for allowing me to participate in the care of your patient with high level of medical complexity requiring a high level of medical decision making. If I can be of any further assistance, please do not hesitate to contact me. Kane Pickard, DO  [address] [place] [address] [phone] Neuroscience

## 2020-07-19 VITALS
TEMPERATURE: 97.8 F | BODY MASS INDEX: 33.27 KG/M2 | SYSTOLIC BLOOD PRESSURE: 106 MMHG | DIASTOLIC BLOOD PRESSURE: 68 MMHG | RESPIRATION RATE: 16 BRPM | HEIGHT: 62 IN | WEIGHT: 180.78 LBS | HEART RATE: 76 BPM | OXYGEN SATURATION: 98 %

## 2020-07-19 LAB
ANION GAP SERPL CALCULATED.3IONS-SCNC: 10 MMOL/L (ref 3–16)
BASOPHILS ABSOLUTE: 0 K/UL (ref 0–0.2)
BASOPHILS RELATIVE PERCENT: 0.2 %
BUN BLDV-MCNC: 9 MG/DL (ref 7–20)
CALCIUM SERPL-MCNC: 9 MG/DL (ref 8.3–10.6)
CHLORIDE BLD-SCNC: 105 MMOL/L (ref 99–110)
CHOLESTEROL, TOTAL: 157 MG/DL (ref 0–199)
CO2: 21 MMOL/L (ref 21–32)
CREAT SERPL-MCNC: 0.6 MG/DL (ref 0.6–1.1)
EOSINOPHILS ABSOLUTE: 0.1 K/UL (ref 0–0.6)
EOSINOPHILS RELATIVE PERCENT: 2.5 %
ESTIMATED AVERAGE GLUCOSE: 375.2 MG/DL
GFR AFRICAN AMERICAN: >60
GFR NON-AFRICAN AMERICAN: >60
GLUCOSE BLD-MCNC: 270 MG/DL (ref 70–99)
HBA1C MFR BLD: 14.7 %
HCT VFR BLD CALC: 37 % (ref 36–48)
HDLC SERPL-MCNC: 30 MG/DL (ref 40–60)
HEMOGLOBIN: 11.5 G/DL (ref 12–16)
LDL CHOLESTEROL CALCULATED: 98 MG/DL
LYMPHOCYTES ABSOLUTE: 2.2 K/UL (ref 1–5.1)
LYMPHOCYTES RELATIVE PERCENT: 46.7 %
MCH RBC QN AUTO: 21.8 PG (ref 26–34)
MCHC RBC AUTO-ENTMCNC: 31 G/DL (ref 31–36)
MCV RBC AUTO: 70.4 FL (ref 80–100)
MONOCYTES ABSOLUTE: 0.4 K/UL (ref 0–1.3)
MONOCYTES RELATIVE PERCENT: 9.4 %
NEUTROPHILS ABSOLUTE: 1.9 K/UL (ref 1.7–7.7)
NEUTROPHILS RELATIVE PERCENT: 41.2 %
PDW BLD-RTO: 13.9 % (ref 12.4–15.4)
PLATELET # BLD: 132 K/UL (ref 135–450)
PMV BLD AUTO: 11 FL (ref 5–10.5)
POTASSIUM REFLEX MAGNESIUM: 4.1 MMOL/L (ref 3.5–5.1)
RBC # BLD: 5.26 M/UL (ref 4–5.2)
SODIUM BLD-SCNC: 136 MMOL/L (ref 136–145)
TRIGL SERPL-MCNC: 145 MG/DL (ref 0–150)
VLDLC SERPL CALC-MCNC: 29 MG/DL
WBC # BLD: 4.6 K/UL (ref 4–11)

## 2020-07-19 PROCEDURE — 6370000000 HC RX 637 (ALT 250 FOR IP): Performed by: INTERNAL MEDICINE

## 2020-07-19 PROCEDURE — 85025 COMPLETE CBC W/AUTO DIFF WBC: CPT

## 2020-07-19 PROCEDURE — 2580000003 HC RX 258: Performed by: NURSE PRACTITIONER

## 2020-07-19 PROCEDURE — 80061 LIPID PANEL: CPT

## 2020-07-19 PROCEDURE — G0378 HOSPITAL OBSERVATION PER HR: HCPCS

## 2020-07-19 PROCEDURE — 96366 THER/PROPH/DIAG IV INF ADDON: CPT

## 2020-07-19 PROCEDURE — 83036 HEMOGLOBIN GLYCOSYLATED A1C: CPT

## 2020-07-19 PROCEDURE — 80048 BASIC METABOLIC PNL TOTAL CA: CPT

## 2020-07-19 PROCEDURE — 96365 THER/PROPH/DIAG IV INF INIT: CPT

## 2020-07-19 PROCEDURE — 6370000000 HC RX 637 (ALT 250 FOR IP): Performed by: NURSE PRACTITIONER

## 2020-07-19 PROCEDURE — 36415 COLL VENOUS BLD VENIPUNCTURE: CPT

## 2020-07-19 PROCEDURE — 6360000002 HC RX W HCPCS: Performed by: NURSE PRACTITIONER

## 2020-07-19 PROCEDURE — 94760 N-INVAS EAR/PLS OXIMETRY 1: CPT

## 2020-07-19 RX ADMIN — ASPIRIN 81 MG: 81 TABLET, COATED ORAL at 10:55

## 2020-07-19 RX ADMIN — SODIUM CHLORIDE: 9 INJECTION, SOLUTION INTRAVENOUS at 05:54

## 2020-07-19 RX ADMIN — Medication 1 CAPSULE: at 10:55

## 2020-07-19 RX ADMIN — CEFTRIAXONE 1 G: 1 INJECTION, POWDER, FOR SOLUTION INTRAMUSCULAR; INTRAVENOUS at 04:19

## 2020-07-19 RX ADMIN — METFORMIN HYDROCHLORIDE 500 MG: 500 TABLET ORAL at 16:30

## 2020-07-19 ASSESSMENT — PAIN SCALES - GENERAL: PAINLEVEL_OUTOF10: 0

## 2020-07-19 NOTE — DISCHARGE SUMMARY
Her evaluation has been unremarkable. - lab work stable. - CT head, CTA head and neck and MRI brain all without acute findings. Her symptoms appear to only be present when she is directly observed. When not being observed directly, pt has no problem feeding self with the \"affected hand\", and no problem repositioning in bed or getting around the room. I did entertain possibility of TIA, but pt denies improved or resolved symptoms - even though she is able to use her R arm and leg she insists that she has ongoing deficit. I am concerned about situation of secondary gain or kenrick malingering. I attempted to discuss possible stressors in her life that could trigger this type of behavior, but pt denies feeling stressed or anxious. Hyperglycemia  No prior diagnosis of DMII  Asymptomatic, no DKA or HHS. Will check A1C and start on metformin interim. Will need to follow with PCP for continued management. She is stable for discharge home today. No additional PTOT or equipment is necessary on discharge. Physical Exam Performed:     /68   Pulse 76   Temp 97.8 °F (36.6 °C) (Oral)   Resp 16   Ht 5' 2\" (1.575 m)   Wt 180 lb 12.4 oz (82 kg)   SpO2 98%   BMI 33.06 kg/m²       General appearance:  No apparent distress, appears stated age and cooperative. HEENT:  Normal cephalic, atraumatic without obvious deformity. Pupils equal, round, and reactive to light. Extra ocular muscles intact. Conjunctivae/corneas clear. Neck: Supple, with full range of motion. No jugular venous distention. Trachea midline. Respiratory:  Normal respiratory effort. Clear to auscultation, bilaterally without Rales/Wheezes/Rhonchi. Cardiovascular:  Regular rate and rhythm with normal S1/S2 without murmurs, rubs or gallops. Abdomen: Soft, non-tender, non-distended with normal bowel sounds. Musculoskeletal:  No clubbing, cyanosis or edema bilaterally.   Full range of motion without deformity. Skin: Skin color, texture, turgor normal.  No rashes or lesions. Neurologic:  Neurovascularly intact without any focal sensory/motor deficits. Cranial nerves: II-XII intact, grossly non-focal.  Psychiatric:  Alert and oriented, thought content appropriate, normal insight  Capillary Refill: Brisk,< 3 seconds   Peripheral Pulses: +2 palpable, equal bilaterally       Labs: For convenience and continuity at follow-up the following most recent labs are provided:      CBC:    Lab Results   Component Value Date    WBC 4.6 07/19/2020    HGB 11.5 07/19/2020    HCT 37.0 07/19/2020     07/19/2020       Renal:    Lab Results   Component Value Date     07/19/2020    K 4.1 07/19/2020     07/19/2020    CO2 21 07/19/2020    BUN 9 07/19/2020    CREATININE 0.6 07/19/2020    CALCIUM 9.0 07/19/2020         Significant Diagnostic Studies    Radiology:   MRI brain without contrast   Final Result   No acute intracranial abnormality. No acute infarction. CTA HEAD NECK W CONTRAST   Final Result   No CT evidence of an acute infarct. No flow limiting stenosis or large vessel occlusion detected within the head   or neck. Incidentally noted dental caries. CT HEAD WO CONTRAST   Final Result   No CT evidence of an acute infarct. No flow limiting stenosis or large vessel occlusion detected within the head   or neck. Incidentally noted dental caries. XR CHEST PORTABLE   Final Result   No acute findings. CT Head WO Contrast   Final Result   No acute intracranial abnormality. Consults:     IP CONSULT TO NEUROLOGY    Disposition: home    Condition at Discharge: Stable    Discharge Instructions/Follow-up:  PCP 1 week.      Code Status:  Full Code     Activity: activity as tolerated    Diet: regular diet      Discharge Medications:     Current Discharge Medication List           Details   metFORMIN (GLUCOPHAGE) 500 MG tablet Take 1 tablet by mouth 2 times daily (with meals)  Qty: 60 tablet, Refills: 3             Time Spent on discharge is more than 30 minutes in the examination, evaluation, counseling and review of medications and discharge plan. Signed:    Honorio Escamilla MD   7/19/2020      Thank you No primary care provider on file. for the opportunity to be involved in this patient's care. If you have any questions or concerns please feel free to contact me at [phone].

## 2020-07-19 NOTE — PROGRESS NOTES
NAME:  Lainey Nava  YOB: 1965  MEDICAL RECORD NUMBER:  [id number]  TODAYS DATE:  7/19/2020    Discussed personal risk factors for Stroke /TIA with patient/family, and ways to reduce the risk for a recurrent stroke. Patient's personal risk factors which were identified are:     [] Alcohol Abuse: check with your physician before any alcohol consumption. [] Atrial fibrillation: may cause blood clots. [] Drug Abuse: Seek help, talk with your doctor  [] Clotting Disorder  [] Diabetes  [] Family history of stroke or heart disease  [] High Blood Pressure/Hypertension: work with your physician.  [] High cholesterol: monitor cholesterol levels with your physician.   [] Overweight/Obesity: work with your physician for your ideal body weight. [x] Physical Inactivity: get regular exercise as directed by your physician. [] Personal history of previous TIA or stroke  [] Poor Diet; decrease salt (sodium) in your diet, follow diet directed by physician. [x] Smoking: Cigarette/Cigar: stop smoking. Advised pt. that you can reduce your risk for stroke/TIA by modifying/controlling the risk factors that you have. Pt.advised to take the medications as prescribed, which will be detailed in the discharge instructions, and to not stop taking them without consulting their physician. In addition, pt. advised to maintain a healthy diet, exercise regularly and to not smoke. [facility]'s Stroke treatment and prevention, Managing your recovery  notebook  provided and/or reviewed  with patient/family. The notebook includes, but not limited to, sections addressing warning signs & symptoms of a stroke, which are: sudden numbness or weakness especially on one side of the body, sudden confusion, difficulty speaking or understanding, sudden changes in vision, sudden dizziness or loss of balance/ coordination, or sudden severe headache.   The need to call EMS ([phone]) immediately if signs & symptoms occur is

## 2020-07-19 NOTE — PLAN OF CARE
Problem: Falls - Risk of:  Goal: Will remain free from falls  Description: Will remain free from falls  7/19/2020 1109 by Tanya Forde RN  Outcome: Completed  7/19/2020 0006 by Moni Solano RN  Outcome: Ongoing  Goal: Absence of physical injury  Description: Absence of physical injury  7/19/2020 1109 by Tanya Forde RN  Outcome: Completed  7/19/2020 0006 by Moni Solano RN  Outcome: Ongoing     Problem: Pain:  Goal: Pain level will decrease  Description: Pain level will decrease  7/19/2020 1109 by Tanya Forde RN  Outcome: Completed  7/19/2020 0006 by Moni Solano RN  Outcome: Ongoing  Goal: Control of acute pain  Description: Control of acute pain  7/19/2020 1109 by Tanya Forde RN  Outcome: Completed  7/19/2020 0006 by Moni Solano RN  Outcome: Ongoing  Goal: Control of chronic pain  Description: Control of chronic pain  Outcome: Completed     Problem: Nutrition  Goal: Optimal nutrition therapy  7/19/2020 1109 by Tanya Forde RN  Outcome: Completed  7/19/2020 0006 by Moni Solano RN  Outcome: Ongoing

## 2020-07-19 NOTE — PLAN OF CARE
Problem: Falls - Risk of:  Goal: Will remain free from falls  Description: Will remain free from falls  Outcome: Ongoing  Goal: Absence of physical injury  Description: Absence of physical injury  Outcome: Ongoing     Problem: Pain:  Description: Pain management should include both nonpharmacologic and pharmacologic interventions.   Goal: Pain level will decrease  Description: Pain level will decrease  Outcome: Ongoing  Goal: Control of acute pain  Description: Control of acute pain  Outcome: Ongoing     Problem: Nutrition  Goal: Optimal nutrition therapy  7/19/2020 0006 by Marshal Mccray RN  Outcome: Ongoing

## 2020-07-19 NOTE — CARE COORDINATION
INITIAL CASE MANAGEMENT ASSESSMENT    Reviewed chart, met with patient to assess possible discharge needs. Explained Case Management role/services. Living Situation: confirmed address, lives alone in an apartment with 8 steps to enter - usually no issues with the steps    ADLs: independent prior to this episode     DME: none    PT/OT Recs: Patient would benefit from continued therapy after discharge   PT Equipment Recommendations  Equipment Needed: Yes(will monitor needs--possible RW at present time.)  Mobility Devices: Derral Balk: Rolling     Active Services: None     Transportation: Does not drive, friends and family transport     Medications: No insurance. Given [employer] card and information and [employer] [employer]. Also given [facility] information for applying for Medicaid. PCP: Does not have one, given clinic information as patient does not currently have insurance. HD/PD: N/A    PLAN/COMMENTS: Plan is to return home. CM provided contact information for patient or family to call with any questions. CM will follow and assist as needed. Electronically signed by Delicia Bui RN on 7/19/2020 at 11:30 AM     Spoke with patient regarding therapy's recommendation for a rolling walker. She does not know of any she can borrow and does not know if she can get to a [employer] store to purchase one. She is not able to afford a walker from DME provider. Message sent to Dr. Alen Sanchez to address, he reports that the patient was just able to get up to the bathroom with the nurse without device, so she does not need therapy or DME at home. Attempted to speak with patient's nurse, she is not available. Spoke with patient, she is agreeable that she should be OK without walker and therapy at home. She denies needs at this time.     Electronically signed by Delicia Bui RN on 7/19/2020 at 12:08 PM

## 2020-07-19 NOTE — PROGRESS NOTES
Discharge instructions reviewed with patient; verbalizes understanding of discharge medication and to call a clinic from list provided by social work for appointment to follow up on diabetes management. IV and cardiac monitor discontinued. Tolerated well. Patient denies any further questions/concerns. Patient being wheeled to main lobby at this time for discharge to home.

## 2022-01-04 NOTE — PLAN OF CARE
Problem: Falls - Risk of:  Goal: Will remain free from falls  Description: Will remain free from falls  Outcome: Ongoing  Note: Fall precautions in place. Bed locked and in lowest position. Belongings and call light within reach.    Goal: Absence of physical injury  Description: Absence of physical injury  Outcome: Ongoing     Problem: Pain:  Goal: Pain level will decrease  Description: Pain level will decrease  Outcome: Ongoing  Note: No c/o pain at this time  Goal: Control of acute pain  Description: Control of acute pain  Outcome: Ongoing  Goal: Control of chronic pain  Description: Control of chronic pain  Outcome: Ongoing Returned dads call to schedule patient for flu vaccine  Lm on vm to call us back   LP

## 2024-06-17 ENCOUNTER — TELEPHONE (OUTPATIENT)
Dept: PAIN MANAGEMENT | Age: 59
End: 2024-06-17